# Patient Record
Sex: FEMALE | Race: OTHER | HISPANIC OR LATINO
[De-identification: names, ages, dates, MRNs, and addresses within clinical notes are randomized per-mention and may not be internally consistent; named-entity substitution may affect disease eponyms.]

---
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
## 2019-03-04 ENCOUNTER — APPOINTMENT (OUTPATIENT)
Dept: OBGYN | Facility: CLINIC | Age: 19
End: 2019-03-04
Payer: COMMERCIAL

## 2019-03-04 VITALS
BODY MASS INDEX: 22.58 KG/M2 | DIASTOLIC BLOOD PRESSURE: 65 MMHG | HEIGHT: 60 IN | WEIGHT: 115 LBS | SYSTOLIC BLOOD PRESSURE: 96 MMHG

## 2019-03-04 DIAGNOSIS — Z01.419 ENCOUNTER FOR GYNECOLOGICAL EXAMINATION (GENERAL) (ROUTINE) W/OUT ABNORMAL FINDINGS: ICD-10-CM

## 2019-03-04 PROCEDURE — 99395 PREV VISIT EST AGE 18-39: CPT

## 2019-03-04 NOTE — HISTORY OF PRESENT ILLNESS
[1 Year Ago] : 1 year ago [Good] : being in good health [Healthy Diet] : a healthy diet [Regular Exercise] : regular exercise [Weight Concerns] : no concerns with her weight [Reproductive Age] : is of reproductive age [Menstrual Problems] : reports normal menses [Contraception] : uses contraception [Condoms] : uses condoms [Pregnancy History] : denies prior pregnancies [Normal Amount/Duration] : was of a normal amount and duration [Spotting Between  Menses] : no spotting between menses [Regular Cycle Intervals] : periods have been regular [Prior Menses:  ___ Date] : date of prior menstruation was [unfilled] [Sexually Active] : is sexually active

## 2019-03-05 LAB
C TRACH RRNA SPEC QL NAA+PROBE: NOT DETECTED
N GONORRHOEA RRNA SPEC QL NAA+PROBE: NOT DETECTED
SOURCE AMPLIFICATION: NORMAL

## 2019-08-13 ENCOUNTER — APPOINTMENT (OUTPATIENT)
Dept: OBGYN | Facility: CLINIC | Age: 19
End: 2019-08-13
Payer: COMMERCIAL

## 2019-08-13 VITALS — SYSTOLIC BLOOD PRESSURE: 93 MMHG | WEIGHT: 112 LBS | DIASTOLIC BLOOD PRESSURE: 56 MMHG

## 2019-08-13 DIAGNOSIS — N64.4 MASTODYNIA: ICD-10-CM

## 2019-08-13 PROCEDURE — 99214 OFFICE O/P EST MOD 30 MIN: CPT

## 2019-08-13 RX ORDER — LEVONORGESTREL AND ETHINYL ESTRADIOL 0.1-0.02MG
0.1-2 KIT ORAL DAILY
Qty: 1 | Refills: 0 | Status: ACTIVE | COMMUNITY
Start: 2019-08-13 | End: 1900-01-01

## 2019-08-13 NOTE — PHYSICAL EXAM
[Mass] : no breast mass [Tender] : tenderness [Nipple Discharge] : no nipple discharge [Axillary LAD] : no axillary lymphadenopathy

## 2019-08-13 NOTE — HISTORY OF PRESENT ILLNESS
[Irregular Menses] : irregular menses [Light Bleeding] : described as light in severity [Pain] : no pelvic pain [Dizziness] : no dizziness [Menopausal Symptoms] : no manopausal symptoms [Pregnancy] : no pregnancy [Diffused Pain] : diffused breast pain [Right Breast] : right [Fever] : no fever [Chills] : no chills [Swollen Glands] : no swollen glands [Pressure] : worsened with pressure [Normal Amount/Duration] : was of a normal amount and duration [Spotting Between  Menses] : no spotting between menses [Regular Cycle Intervals] : periods have been irregular [Prior Menses:  ___ Date] : date of prior menstruation was [unfilled]

## 2020-12-15 PROBLEM — N76.0 BACTERIAL VAGINITIS: Status: RESOLVED | Noted: 2017-01-23 | Resolved: 2020-12-15

## 2020-12-21 ENCOUNTER — APPOINTMENT (OUTPATIENT)
Dept: OBGYN | Facility: CLINIC | Age: 20
End: 2020-12-21

## 2020-12-21 PROBLEM — Z01.419 ENCOUNTER FOR GYNECOLOGICAL EXAMINATION: Status: RESOLVED | Noted: 2019-03-04 | Resolved: 2020-12-21

## 2021-01-19 ENCOUNTER — APPOINTMENT (OUTPATIENT)
Dept: OBGYN | Facility: CLINIC | Age: 21
End: 2021-01-19
Payer: COMMERCIAL

## 2021-01-19 VITALS
DIASTOLIC BLOOD PRESSURE: 66 MMHG | HEIGHT: 60 IN | SYSTOLIC BLOOD PRESSURE: 99 MMHG | BODY MASS INDEX: 20.22 KG/M2 | WEIGHT: 103 LBS

## 2021-01-19 DIAGNOSIS — Z01.419 ENCOUNTER FOR GYNECOLOGICAL EXAMINATION (GENERAL) (ROUTINE) W/OUT ABNORMAL FINDINGS: ICD-10-CM

## 2021-01-19 PROCEDURE — 99072 ADDL SUPL MATRL&STAF TM PHE: CPT

## 2021-01-19 PROCEDURE — 99395 PREV VISIT EST AGE 18-39: CPT

## 2021-01-19 PROCEDURE — 87210 SMEAR WET MOUNT SALINE/INK: CPT | Mod: QW

## 2021-01-19 NOTE — HISTORY OF PRESENT ILLNESS
[TextBox_4] : Annual exam for this 20 year old female, G0, LMP 1/10/21 with no gyn complaints. Wants a Cu7 IUD.

## 2021-01-20 LAB
C TRACH RRNA SPEC QL NAA+PROBE: NOT DETECTED
N GONORRHOEA RRNA SPEC QL NAA+PROBE: NOT DETECTED
SOURCE TP AMPLIFICATION: NORMAL

## 2021-01-23 LAB — CYTOLOGY CVX/VAG DOC THIN PREP: NORMAL

## 2021-02-10 ENCOUNTER — APPOINTMENT (OUTPATIENT)
Dept: OBGYN | Facility: CLINIC | Age: 21
End: 2021-02-10
Payer: COMMERCIAL

## 2021-02-10 ENCOUNTER — ASOB RESULT (OUTPATIENT)
Age: 21
End: 2021-02-10

## 2021-02-10 PROCEDURE — 76830 TRANSVAGINAL US NON-OB: CPT

## 2021-02-10 PROCEDURE — 99072 ADDL SUPL MATRL&STAF TM PHE: CPT

## 2021-04-07 ENCOUNTER — TRANSCRIPTION ENCOUNTER (OUTPATIENT)
Age: 21
End: 2021-04-07

## 2021-05-18 ENCOUNTER — APPOINTMENT (OUTPATIENT)
Dept: DISASTER EMERGENCY | Facility: OTHER | Age: 21
End: 2021-05-18

## 2022-04-26 ENCOUNTER — APPOINTMENT (OUTPATIENT)
Dept: OBGYN | Facility: CLINIC | Age: 22
End: 2022-04-26

## 2022-10-05 ENCOUNTER — APPOINTMENT (OUTPATIENT)
Dept: OBGYN | Facility: CLINIC | Age: 22
End: 2022-10-05

## 2022-10-26 ENCOUNTER — APPOINTMENT (OUTPATIENT)
Dept: OBGYN | Facility: CLINIC | Age: 22
End: 2022-10-26

## 2023-02-07 ENCOUNTER — APPOINTMENT (OUTPATIENT)
Dept: OBGYN | Facility: CLINIC | Age: 23
End: 2023-02-07

## 2023-03-01 ENCOUNTER — NON-APPOINTMENT (OUTPATIENT)
Age: 23
End: 2023-03-01

## 2023-03-21 ENCOUNTER — APPOINTMENT (OUTPATIENT)
Dept: OBGYN | Facility: CLINIC | Age: 23
End: 2023-03-21
Payer: COMMERCIAL

## 2023-03-21 ENCOUNTER — ASOB RESULT (OUTPATIENT)
Age: 23
End: 2023-03-21

## 2023-03-21 VITALS
BODY MASS INDEX: 19.24 KG/M2 | WEIGHT: 98 LBS | HEIGHT: 60 IN | DIASTOLIC BLOOD PRESSURE: 60 MMHG | SYSTOLIC BLOOD PRESSURE: 100 MMHG

## 2023-03-21 DIAGNOSIS — N83.202 UNSPECIFIED OVARIAN CYST, LEFT SIDE: ICD-10-CM

## 2023-03-21 DIAGNOSIS — N92.6 IRREGULAR MENSTRUATION, UNSPECIFIED: ICD-10-CM

## 2023-03-21 DIAGNOSIS — R10.2 PELVIC AND PERINEAL PAIN: ICD-10-CM

## 2023-03-21 PROCEDURE — 99213 OFFICE O/P EST LOW 20 MIN: CPT | Mod: 25

## 2023-03-21 PROCEDURE — 87210 SMEAR WET MOUNT SALINE/INK: CPT | Mod: QW

## 2023-03-21 PROCEDURE — 99395 PREV VISIT EST AGE 18-39: CPT | Mod: 25

## 2023-03-21 PROCEDURE — 76830 TRANSVAGINAL US NON-OB: CPT

## 2023-03-21 NOTE — HISTORY OF PRESENT ILLNESS
[TextBox_4] : Annual exam for this 22 year old female, G0, LMP 3/2/23 c/o irregular menses and premenstrual bloating and pelvic pain 1 week prior to menses x 1 year.

## 2023-03-21 NOTE — PHYSICAL EXAM
[Appropriately responsive] : appropriately responsive [Alert] : alert [No Acute Distress] : no acute distress [No Lymphadenopathy] : no lymphadenopathy [Regular Rate Rhythm] : regular rate rhythm [No Murmurs] : no murmurs [Clear to Auscultation B/L] : clear to auscultation bilaterally [Soft] : soft [Non-tender] : non-tender [Non-distended] : non-distended [No HSM] : No HSM [No Lesions] : no lesions [No Mass] : no mass [Oriented x3] : oriented x3 [Scant] : There was scant vaginal bleeding

## 2023-03-25 LAB — CYTOLOGY CVX/VAG DOC THIN PREP: NORMAL

## 2023-06-27 ENCOUNTER — APPOINTMENT (OUTPATIENT)
Dept: OBGYN | Facility: CLINIC | Age: 23
End: 2023-06-27

## 2023-07-11 ENCOUNTER — APPOINTMENT (OUTPATIENT)
Dept: OBGYN | Facility: CLINIC | Age: 23
End: 2023-07-11
Payer: COMMERCIAL

## 2023-07-11 VITALS
SYSTOLIC BLOOD PRESSURE: 88 MMHG | DIASTOLIC BLOOD PRESSURE: 58 MMHG | BODY MASS INDEX: 18.65 KG/M2 | HEIGHT: 60 IN | WEIGHT: 95 LBS

## 2023-07-11 DIAGNOSIS — N91.1 SECONDARY AMENORRHEA: ICD-10-CM

## 2023-07-11 PROCEDURE — 99214 OFFICE O/P EST MOD 30 MIN: CPT

## 2023-07-11 RX ORDER — MEDROXYPROGESTERONE ACETATE 10 MG/1
10 TABLET ORAL DAILY
Qty: 5 | Refills: 0 | Status: ACTIVE | COMMUNITY
Start: 2023-07-11 | End: 1900-01-01

## 2023-07-11 NOTE — CHIEF COMPLAINT
[FreeTextEntry1] : c/o missed menses for 85 days; not pregnant (impossible). Wants a menses. Lost 5 lbs. in 6 months because of increased activity.

## 2023-07-20 ENCOUNTER — ASOB RESULT (OUTPATIENT)
Age: 23
End: 2023-07-20

## 2023-07-20 ENCOUNTER — APPOINTMENT (OUTPATIENT)
Dept: OBGYN | Facility: CLINIC | Age: 23
End: 2023-07-20
Payer: COMMERCIAL

## 2023-07-20 PROCEDURE — 76830 TRANSVAGINAL US NON-OB: CPT

## 2023-07-21 ENCOUNTER — NON-APPOINTMENT (OUTPATIENT)
Age: 23
End: 2023-07-21

## 2023-09-12 ENCOUNTER — APPOINTMENT (OUTPATIENT)
Dept: ORTHOPEDIC SURGERY | Facility: CLINIC | Age: 23
End: 2023-09-12

## 2023-12-08 ENCOUNTER — APPOINTMENT (OUTPATIENT)
Dept: ORTHOPEDIC SURGERY | Facility: CLINIC | Age: 23
End: 2023-12-08

## 2023-12-12 ENCOUNTER — APPOINTMENT (OUTPATIENT)
Dept: ORTHOPEDIC SURGERY | Facility: CLINIC | Age: 23
End: 2023-12-12
Payer: COMMERCIAL

## 2023-12-12 VITALS — BODY MASS INDEX: 17.75 KG/M2 | HEIGHT: 61 IN | WEIGHT: 94 LBS

## 2023-12-12 DIAGNOSIS — S76.311A STRAIN OF MUSCLE, FASCIA AND TENDON OF THE POSTERIOR MUSCLE GROUP AT THIGH LEVEL, RIGHT THIGH, INITIAL ENCOUNTER: ICD-10-CM

## 2023-12-12 DIAGNOSIS — S39.012A STRAIN OF MUSCLE, FASCIA AND TENDON OF LOWER BACK, INITIAL ENCOUNTER: ICD-10-CM

## 2023-12-12 PROCEDURE — 73502 X-RAY EXAM HIP UNI 2-3 VIEWS: CPT

## 2023-12-12 PROCEDURE — 99203 OFFICE O/P NEW LOW 30 MIN: CPT

## 2023-12-12 PROCEDURE — 72100 X-RAY EXAM L-S SPINE 2/3 VWS: CPT

## 2024-01-01 NOTE — HISTORY OF PRESENT ILLNESS
[de-identified] : This is Ms. KATYA FRASER  a 23 year old female who comes in today complaining of Rt hamstring pain. Patient state she has been feeling pain for about a year. She state when she moves her neck a certain way she feels pain from the neck gown down her back and to the right thigh area. notes bending forward causes right posterior thigh pain and tghtness, notes she had giving out of rht right leg going down the stairs recently. no previous eval or treatment [FreeTextEntry7] : from neck down back to rt thigh

## 2024-01-01 NOTE — DISCUSSION/SUMMARY
[de-identified] : plan for PT at this time.  fu 6 wk  ----------------------------------------------------------------------------  All relevant imaging studies pertinent to today's visit, including x-rays, MRI's and/or other advanced imaging studies (CT/etc) were independently interpreted and reviewed with the patient as needed. Implications of the studies together with the patient's clinical picture were discussed to formulate a working diagnosis and management options were detailed.  The patient was advised of the diagnosis.  The natural history of the pathology was explained in full. All questions were answered.  The risks and benefits of conservative and interventional treatment alternatives were explained to the patient

## 2024-01-01 NOTE — IMAGING
[de-identified] :  ----------------------------------------------------------------------------   Right hip exam:   Inspection: no deformity, no swelling, no gross limb length discrepancy ROM:    Flexion: 100    ER: 50    IR: 20 Tenderness:    (neg) Groin tenderness    (neg) Greater trochanteric tenderness    (neg) Buttock tenderness    (neg) IT Band tenderness    (neg) Anterior thigh tenderness    (neg)ASIS tenderness    (neg) Ischial tuberosity    (neg) Hamstring muscle tenderness Additional tests:    (neg) FADIR    (neg) STAR    (neg) Resisted hip flexion pain    (neg) Apprehension (external rotation/extension)    (neg) Posterior pain with forced hip flexion and knee extension    (neg) Tight hamstrings    (neg) Log roll    (neg) Axial load Strength: 5/5 IP/Q/H/TA/GS/EHL Neuro: In tact to light touch throughout, DTR's wnl Vascularity: Extremity warm and well perfused Gait: normal.    ----------------------------------------------------------------------------   Thoracic/Lumbar spine exam:   Inspection:    (neg) Abnormal alignment (kyphosis/lordosis/scoliosis)   (neg) Atrophy ROM:    Pain:           (neg) Flexion/extension     (neg) Rotation    Stiffness:   (neg) Flexion/extension     (neg) Rotation                       (neg) Hamstring tightness Tenderness/Spasm:              Lumbar paraspinal:          (neg) Right    (neg) Left    (neg) Midline    Thoracic paraspinal:        (neg) Right    (neg) Left    (neg) Midline    PSIS:                                (neg) Right    (neg) Left    SI joint:                             (neg) Right    (neg) Left    Greater troch:                  (neg) Right    (neg) Left Strength: (out of 5)    Illiopsoas:           Right: 5   .    Left: 5    Quad:                 Right: 5   .    Left: 5    Hamstrings:        Right: 5   .    Left: 5    Anterior tibialis:  Right: 5    .   Left: 5    Gastrocsoleus:  Right: 5    .   Left: 5    EHL:                    Right: 5    .   Left: 5 Neuro: DTR's wnl.  Sensation to light touch grossly in tact in all distributions.    (neg) SLR    (neg) Femoral stretch Vascularity: Extremity warm and well perfused Gait: normal   ----------------------------------------------------------------------------   Cervical spine exam:   Inspection:        (neg) Abnormal alignment (kyphosis/lordosis)   (neg) Atrophy ROM:    Pain:               (neg) Flexion/extension    (neg) Rotation    Stiffness:        (neg) Flexion/extension    (neg) Rotation Tenderness:    Trapezial:       (neg) Right    (neg) Left    (neg) Midline    Paraspinal:     (neg) Right    (neg) Left    Rhomboid :     (neg) Right    (neg) Left    Scapula:         (neg) Right   (neg) Left Strength:    Deltoid:          Right: 5/5   .  Left: 5/5    Biceps:          Right: 5/5   .  Left: 5/5    Triceps:         Right: 5/5   .  Left: 5/5    Wrist flex      Right: 5/5   .   Left: 5/5    Wrist ext:      Right: 5/5   .   Left: 5/5    Hand:            Right: 5/5   .   Left: 5/5 Neuro: DTR's wnl.  Sensation to light touch grossly in tact in all distributions.    (neg) Erasto's    (neg) Spurling    (neg) Lhermitte Vascularity: Extremity warm and well perfused Gait: normal  [No bony abnormalities] : No bony abnormalities [Right] : right hip with pelvis [All Views] : anteroposterior, lateral [There are no fractures, subluxations or dislocations. No significant abnormalities are seen] : There are no fractures, subluxations or dislocations. No significant abnormalities are seen

## 2024-05-15 ENCOUNTER — TRANSCRIPTION ENCOUNTER (OUTPATIENT)
Age: 24
End: 2024-05-15